# Patient Record
Sex: FEMALE | Race: WHITE
[De-identification: names, ages, dates, MRNs, and addresses within clinical notes are randomized per-mention and may not be internally consistent; named-entity substitution may affect disease eponyms.]

---

## 2021-05-21 ENCOUNTER — HOSPITAL ENCOUNTER (OUTPATIENT)
Dept: HOSPITAL 79 - OR | Age: 31
Setting detail: OBSERVATION
Discharge: HOME | End: 2021-05-21
Attending: OBSTETRICS & GYNECOLOGY | Admitting: OBSTETRICS & GYNECOLOGY
Payer: COMMERCIAL

## 2021-05-21 VITALS — HEIGHT: 60 IN | BODY MASS INDEX: 40.44 KG/M2 | WEIGHT: 206 LBS

## 2021-05-21 DIAGNOSIS — F17.290: ICD-10-CM

## 2021-05-21 DIAGNOSIS — E66.9: ICD-10-CM

## 2021-05-21 DIAGNOSIS — K66.1: ICD-10-CM

## 2021-05-21 DIAGNOSIS — O00.90: Primary | ICD-10-CM

## 2021-05-21 DIAGNOSIS — N83.201: ICD-10-CM

## 2021-05-21 DIAGNOSIS — Z20.822: ICD-10-CM

## 2021-05-21 LAB
BUN/CREATININE RATIO: 14 (ref 0–10)
HGB BLD-MCNC: 13.8 GM/DL (ref 12.3–15.3)
RED BLOOD COUNT: 4.12 M/UL (ref 4–5.1)
WHITE BLOOD COUNT: 7 K/UL (ref 4.5–11)

## 2021-05-21 PROCEDURE — G0378 HOSPITAL OBSERVATION PER HR: HCPCS

## 2021-05-21 PROCEDURE — U0002 COVID-19 LAB TEST NON-CDC: HCPCS

## 2021-05-21 PROCEDURE — G0379 DIRECT REFER HOSPITAL OBSERV: HCPCS

## 2021-05-21 NOTE — NUR
RHOGAM SHOT GIVEN TO LEFT DELTOID MUSCLE VIA IM AT 1955 ON 5/21/21. PT
TOLERATED WELL AND STATED SHE HAS HAD "MANY OF THIS SHOT BEFORE." WCTM

## 2021-05-22 NOTE — NUR
PT ARRIVED BACK TO FLOOR AT 2310. PT STABLE WITH VSS. NO SIGNS OF DISTRESS
NOTED. PT RESTING WELL. O2 REMOVED. PT O2 SAT 94% ON RA. PT INSTRCUTED TO TAKE
LONG, DEEP BREATHS AND TO AVOID SHORT, RAPID BREATHS AS MUCH AS POSSIBLE. NO
BLEEDING NOTED ON PERIPAD. PT ALERT, TALKING WELL. SIGNIF OTHER AT BEDSIDE. PT
DENIES NAUSEA. PAIN 7/10, MEDS GIVEN PER MD INSTRUCTIONS. PT TOLERATED MEDS
WELL. PT ABLE TO DRINK WELL, ATE SALTINE CRACKERS WITH NO COMPLICATIONS
STATED. 3 TROCAR SITES NOTED ALL DERMABONDED. OCCASIONAL, DRY COUGH NOTED ON
ASSESSMENT. PT STATES DECREASING AND IMPROVEMENT WITH TIME AT LATER ASSESSMENT
BEFORE DISCHARGE. MD NOTIFIED FOR DISCHARGE ORDERS. SEE CHART FOR MORE INFO.

## 2022-04-20 ENCOUNTER — HOSPITAL ENCOUNTER (OUTPATIENT)
Dept: HOSPITAL 79 - GENOP | Age: 32
LOS: 1 days | Discharge: HOME | End: 2022-04-21
Attending: OBSTETRICS & GYNECOLOGY
Payer: COMMERCIAL

## 2022-04-20 DIAGNOSIS — O47.03: Primary | ICD-10-CM

## 2022-04-20 DIAGNOSIS — Z3A.36: ICD-10-CM

## 2022-04-20 DIAGNOSIS — O36.8130: ICD-10-CM

## 2022-04-20 LAB
HGB BLD-MCNC: 12.6 GM/DL (ref 12.3–15.3)
RED BLOOD COUNT: 3.82 M/UL (ref 4–5.1)
WHITE BLOOD COUNT: 8.4 K/UL (ref 4.5–11)

## 2022-04-29 ENCOUNTER — HOSPITAL ENCOUNTER (INPATIENT)
Dept: HOSPITAL 79 - GENOP | Age: 32
LOS: 3 days | Discharge: HOME | End: 2022-05-02
Attending: OBSTETRICS & GYNECOLOGY | Admitting: OBSTETRICS & GYNECOLOGY
Payer: COMMERCIAL

## 2022-04-29 VITALS — HEIGHT: 65 IN | BODY MASS INDEX: 39.65 KG/M2 | WEIGHT: 238 LBS

## 2022-04-29 DIAGNOSIS — Z98.890: ICD-10-CM

## 2022-04-29 DIAGNOSIS — Z83.3: ICD-10-CM

## 2022-04-29 DIAGNOSIS — Z23: ICD-10-CM

## 2022-04-29 DIAGNOSIS — Z81.8: ICD-10-CM

## 2022-04-29 DIAGNOSIS — Z82.0: ICD-10-CM

## 2022-04-29 DIAGNOSIS — Z3A.37: ICD-10-CM

## 2022-04-29 DIAGNOSIS — Z82.49: ICD-10-CM

## 2022-04-29 DIAGNOSIS — N20.0: ICD-10-CM

## 2022-04-29 DIAGNOSIS — Z28.310: ICD-10-CM

## 2022-04-29 DIAGNOSIS — Z20.822: ICD-10-CM

## 2022-04-29 LAB
HGB BLD-MCNC: 12.8 GM/DL (ref 12.3–15.3)
RED BLOOD COUNT: 3.94 M/UL (ref 4–5.1)
WHITE BLOOD COUNT: 10.9 K/UL (ref 4.5–11)

## 2022-04-29 PROCEDURE — U0002 COVID-19 LAB TEST NON-CDC: HCPCS

## 2022-04-30 PROCEDURE — 10907ZC DRAINAGE OF AMNIOTIC FLUID, THERAPEUTIC FROM PRODUCTS OF CONCEPTION, VIA NATURAL OR ARTIFICIAL OPENING: ICD-10-PCS | Performed by: OBSTETRICS & GYNECOLOGY

## 2022-04-30 PROCEDURE — 3E0234Z INTRODUCTION OF SERUM, TOXOID AND VACCINE INTO MUSCLE, PERCUTANEOUS APPROACH: ICD-10-PCS | Performed by: OBSTETRICS & GYNECOLOGY

## 2022-04-30 PROCEDURE — 4A1H7CZ MONITORING OF PRODUCTS OF CONCEPTION, CARDIAC RATE, VIA NATURAL OR ARTIFICIAL OPENING: ICD-10-PCS | Performed by: OBSTETRICS & GYNECOLOGY

## 2022-04-30 PROCEDURE — 3E033VJ INTRODUCTION OF OTHER HORMONE INTO PERIPHERAL VEIN, PERCUTANEOUS APPROACH: ICD-10-PCS | Performed by: OBSTETRICS & GYNECOLOGY

## 2022-04-30 PROCEDURE — 0UH97HZ INSERTION OF CONTRACEPTIVE DEVICE INTO UTERUS, VIA NATURAL OR ARTIFICIAL OPENING: ICD-10-PCS | Performed by: OBSTETRICS & GYNECOLOGY

## 2022-04-30 PROCEDURE — 10H073Z INSERTION OF MONITORING ELECTRODE INTO PRODUCTS OF CONCEPTION, VIA NATURAL OR ARTIFICIAL OPENING: ICD-10-PCS | Performed by: OBSTETRICS & GYNECOLOGY

## 2022-05-01 LAB — HGB BLD-MCNC: 11.5 GM/DL (ref 12.3–15.3)
